# Patient Record
Sex: MALE | Race: WHITE | Employment: FULL TIME | URBAN - METROPOLITAN AREA
[De-identification: names, ages, dates, MRNs, and addresses within clinical notes are randomized per-mention and may not be internally consistent; named-entity substitution may affect disease eponyms.]

---

## 2021-12-28 ENCOUNTER — THERAPY VISIT (OUTPATIENT)
Dept: PHYSICAL THERAPY | Facility: CLINIC | Age: 56
End: 2021-12-28
Payer: COMMERCIAL

## 2021-12-28 DIAGNOSIS — M54.2 NECK PAIN: Primary | ICD-10-CM

## 2021-12-28 PROCEDURE — 97161 PT EVAL LOW COMPLEX 20 MIN: CPT | Mod: GP

## 2021-12-28 PROCEDURE — 97110 THERAPEUTIC EXERCISES: CPT | Mod: GP

## 2021-12-28 NOTE — PROGRESS NOTES
[facility]    OUTPATIENT Physical Therapy ORTHOPEDIC EVALUATION  PLAN OF TREATMENT FOR OUTPATIENT REHABILITATION  (COMPLETE FOR INITIAL CLAIMS ONLY)  Patient's Last Name, First Name, M.I.  YOB: 1965  Khurram Elizalde    Provider s Name:  [facility]   Medical Record No.  [id number]   Start of Care Date:  12/28/21   Onset Date:   12/13/21   Type:     _X__PT   ___OT Medical Diagnosis:    Encounter Diagnosis   Name Primary?     Neck pain Yes        Treatment Diagnosis:  cervicogenic headaches        Goals:     12/28/21 0500   Body Part   Goals listed below are for cervicogenic HA   Goal #1   Goal #1 headaches   Previous Functional Level No headaches   Current Functional Level Intensity of headache   Performance Level at worst 6/10   STG Target Performance Decrease intensity of headaches to   Performance Level no greater than 4/10   Rationale for full and safe concentration;to establish restorative sleep pattern;to improve quality of life and resume normal social activities   Due Date 01/25/22   LTG Target Performance Decrease intensity of headaches to   Performance Level no greater than 2/10   Rationale for full and safe concentration;to establish restorative sleep pattern;to improve quality of life and resume normal social activities   Due Date 02/22/22       Therapy Frequency:  1x/week  Predicted Duration of Therapy Intervention:  8 weeks    Mylene Palacio PT                 I CERTIFY THE NEED FOR THESE SERVICES FURNISHED UNDER        THIS PLAN OF TREATMENT AND WHILE UNDER MY CARE .             Physician Signature               Date    X_____________________________________________________                             Certification Date From:  12/28/21   Certification Date To:  03/27/22    Referring Provider:  Linnea Murray PA-C    Initial Assessment        See Epic  Evaluation SOC Date: 12/28/21

## 2021-12-28 NOTE — PROGRESS NOTES
Physical Therapy Initial Evaluation  Therapist Impression: Khurram is a 56 year old year old male referred to physical therapy by Linnea Murray PA-C for treatment of cervicogenic headaches. Patient presents to physical therapy with c/o R sided neck pain and R sided headaches. Subjective history and objective findings are consistent with diagnosis. Due to these impairments, patient has difficulty with sleeping and job/daily activities without head/neck pain. Patient will benefit from skilled PT emphasizing symptom management, neck ROM and strengthening to address impairments/limitations in order to reach patient's goals, facilitate return to prior level of function, and maximize participation.    KEY FINDINGS:  1. R sided cervical tightness > L  2. Nearly normal cervical ROM w/ decreased L sidebending/rotation  3. Normal dermatomes/myotomes    Subjective:  The history is provided by the patient. No  was used.   Therapist Generated HPI Evaluation  Problem details: Pt presents with neck pain and headaches. Has known DDD in cervical spine per pt. Pt typically sleeps on R side, now this seems to be causing R sided headaches, neck pan and increasing baseline plugged ear/tinnitus. This has been ongoing for 6 or so months. No change around this time that pt can think of. Walks regularly for exercise, especially in nicer weather. Works combination from home and office, when he goes in to work he will have to walk around a decent amount, desai, lift. Computer work does not seem to increase his headaches. When he works from home he typically works at kitchen table. When he works in person he doesn't usually have a desk, sometimes he has to sit on the floor - works installing different IT things. Does not seem to increase symptoms. Pain at worst 5-6/10. At times pain will wake him up at night but does not keep him awake. Has tried avoiding sleeping on R side and this helps.  .         Type of problem:   Cervical spine.    This is a chronic condition.  Condition occurred with:  Insidious onset.  Where condition occurred: for unknown reasons.  Patient reports pain:  Cervical right side (R sided occipital/parietal headaches).  Pain is described as aching (neck tightness, no sharp headaches) and is intermittent.  Pain radiates to:  Head. Pain timing: not necessarily time dependent.  Since onset symptoms are unchanged.  Associated symptoms:  Dizziness, headache and tingling (R UE). Exacerbated by: sometimes lights, sleeping on R side.  and relieved by NSAID's (nap, turning down lights).  Special tests included:  CT scan and MRI (Through [facility] and [facility] earlier this month for cervical spine).  Previous treatment includes chiropractic (for 2.5 months). There was mild improvement following previous treatment.  Restrictions due to condition include:  Working in normal job without restrictions.  Barriers include:  None as reported by patient.    Patient Health History    Problem began: 12/13/2021 (MD order).      Pain is reported as 3/10 on pain scale.  General health as reported by patient is good.  Pertinent medical history includes: sleep disorder/apnea, diabetes, high blood pressure and osteoarthritis.   Red flags:  None as reported by patient.     Surgeries include:  Orthopedic surgery and other (tonsils, knee arthroscopy).    Current medications:  High blood pressure medication and pain medication. Other medications details: diabetes.    Current occupation is IT.   Primary job tasks include:  Computer work, driving, lifting/carrying and prolonged sitting.                                    Objective:  System              Cervical/Thoracic Evaluation    AROM:  AROM Cervical:    Flexion:            WNL * tightness  Extension:       Min loss  Rotation:         Left: 65 deg * tightness     Right: 70 deg  Side Bend:      Left: mod loss * tightness     Right:  Min loss      Headaches: cervical (no hx of  migraines)  Cervical Myotomes:  normal                      Cervical Dermatomes:  normal                    Cervical Palpation:  : not TTP, tightness R>L.    Tenderness present at Right:    Upper Trap; Levator; Erector Spinae and Suboccipitals                                                  General     ROS    Assessment/Plan:    Patient is a 56 year old male with cervical complaints.    Patient has the following significant findings with corresponding treatment plan.                Diagnosis 1:  Cervicogenic headaches  Pain -  hot/cold therapy, mechanical traction, manual therapy, self management, education, directional preference exercise and home program  Decreased ROM/flexibility - manual therapy, therapeutic exercise, therapeutic activity and home program  Decreased strength - therapeutic exercise, therapeutic activities and home program  Decreased function - therapeutic activities and home program  Impaired posture - neuro re-education, therapeutic activities and home program    Therapy Evaluation Codes:   Cumulative Therapy Evaluation is: Low complexity.    Previous and current functional limitations:  (See Goal Flow Sheet for this information)    Short term and Long term goals: (See Goal Flow Sheet for this information)     Communication ability:  Patient appears to be able to clearly communicate and understand verbal and written communication and follow directions correctly.  Treatment Explanation - The following has been discussed with the patient:   RX ordered/plan of care  Anticipated outcomes  Possible risks and side effects  This patient would benefit from PT intervention to resume normal activities.   Rehab potential is excellent.    Frequency:  1 X week, once daily  Duration:  for 8 weeks  Discharge Plan:  Achieve all LTG.  Independent in home treatment program.  Reach maximal therapeutic benefit.    Please refer to the daily flowsheet for treatment today, total treatment time and time spent  performing 1:1 timed codes.

## 2022-02-13 ENCOUNTER — HEALTH MAINTENANCE LETTER (OUTPATIENT)
Age: 57
End: 2022-02-13

## 2022-02-28 PROBLEM — M54.2 NECK PAIN: Status: RESOLVED | Noted: 2021-12-28 | Resolved: 2022-02-28

## 2022-02-28 NOTE — PROGRESS NOTES
Pt last seen in PT 12/28/2021.  No f/u was scheduled beyond that.  Consider note from that date to serve as final summary.

## 2022-10-15 ENCOUNTER — HEALTH MAINTENANCE LETTER (OUTPATIENT)
Age: 57
End: 2022-10-15

## 2023-03-26 ENCOUNTER — HEALTH MAINTENANCE LETTER (OUTPATIENT)
Age: 58
End: 2023-03-26

## 2024-05-26 ENCOUNTER — HEALTH MAINTENANCE LETTER (OUTPATIENT)
Age: 59
End: 2024-05-26